# Patient Record
Sex: MALE | Race: BLACK OR AFRICAN AMERICAN
[De-identification: names, ages, dates, MRNs, and addresses within clinical notes are randomized per-mention and may not be internally consistent; named-entity substitution may affect disease eponyms.]

---

## 2020-12-12 ENCOUNTER — HOSPITAL ENCOUNTER (EMERGENCY)
Dept: HOSPITAL 8 - ED | Age: 20
Discharge: HOME | End: 2020-12-12
Payer: SELF-PAY

## 2020-12-12 VITALS — HEIGHT: 72 IN | BODY MASS INDEX: 19.47 KG/M2 | WEIGHT: 143.74 LBS

## 2020-12-12 VITALS — SYSTOLIC BLOOD PRESSURE: 116 MMHG | DIASTOLIC BLOOD PRESSURE: 81 MMHG

## 2020-12-12 DIAGNOSIS — Z20.2: ICD-10-CM

## 2020-12-12 DIAGNOSIS — R30.0: Primary | ICD-10-CM

## 2020-12-12 LAB — MICROSCOPIC: (no result)

## 2020-12-12 PROCEDURE — 87491 CHLMYD TRACH DNA AMP PROBE: CPT

## 2020-12-12 PROCEDURE — 81001 URINALYSIS AUTO W/SCOPE: CPT

## 2020-12-12 PROCEDURE — 87086 URINE CULTURE/COLONY COUNT: CPT

## 2020-12-12 PROCEDURE — 96372 THER/PROPH/DIAG INJ SC/IM: CPT

## 2020-12-12 PROCEDURE — 99283 EMERGENCY DEPT VISIT LOW MDM: CPT

## 2020-12-12 PROCEDURE — 87591 N.GONORRHOEAE DNA AMP PROB: CPT

## 2022-01-20 ENCOUNTER — TELEPHONE (OUTPATIENT)
Dept: SCHEDULING | Facility: IMAGING CENTER | Age: 22
End: 2022-01-20

## 2022-06-15 ENCOUNTER — HOSPITAL ENCOUNTER (EMERGENCY)
Facility: MEDICAL CENTER | Age: 22
End: 2022-06-15
Attending: EMERGENCY MEDICINE

## 2022-06-15 VITALS
TEMPERATURE: 97.9 F | SYSTOLIC BLOOD PRESSURE: 123 MMHG | OXYGEN SATURATION: 99 % | HEART RATE: 60 BPM | RESPIRATION RATE: 14 BRPM | DIASTOLIC BLOOD PRESSURE: 80 MMHG | WEIGHT: 159.83 LBS

## 2022-06-15 DIAGNOSIS — N34.2 URETHRITIS: ICD-10-CM

## 2022-06-15 LAB
C TRACH DNA SPEC QL NAA+PROBE: POSITIVE
HIV 1+2 AB+HIV1 P24 AG SERPL QL IA: NORMAL
N GONORRHOEA DNA SPEC QL NAA+PROBE: NEGATIVE
SPECIMEN SOURCE: ABNORMAL
T PALLIDUM AB SER QL IA: NORMAL

## 2022-06-15 PROCEDURE — 86780 TREPONEMA PALLIDUM: CPT

## 2022-06-15 PROCEDURE — 700111 HCHG RX REV CODE 636 W/ 250 OVERRIDE (IP): Performed by: EMERGENCY MEDICINE

## 2022-06-15 PROCEDURE — 96372 THER/PROPH/DIAG INJ SC/IM: CPT

## 2022-06-15 PROCEDURE — 87491 CHLMYD TRACH DNA AMP PROBE: CPT

## 2022-06-15 PROCEDURE — 700102 HCHG RX REV CODE 250 W/ 637 OVERRIDE(OP): Performed by: EMERGENCY MEDICINE

## 2022-06-15 PROCEDURE — 700101 HCHG RX REV CODE 250

## 2022-06-15 PROCEDURE — 87591 N.GONORRHOEAE DNA AMP PROB: CPT

## 2022-06-15 PROCEDURE — 87389 HIV-1 AG W/HIV-1&-2 AB AG IA: CPT

## 2022-06-15 PROCEDURE — A9270 NON-COVERED ITEM OR SERVICE: HCPCS | Performed by: EMERGENCY MEDICINE

## 2022-06-15 PROCEDURE — 99284 EMERGENCY DEPT VISIT MOD MDM: CPT

## 2022-06-15 RX ORDER — DOXYCYCLINE 100 MG/1
100 TABLET ORAL ONCE
Status: COMPLETED | OUTPATIENT
Start: 2022-06-15 | End: 2022-06-15

## 2022-06-15 RX ORDER — CEFTRIAXONE 500 MG/1
500 INJECTION, POWDER, FOR SOLUTION INTRAMUSCULAR; INTRAVENOUS ONCE
Status: COMPLETED | OUTPATIENT
Start: 2022-06-15 | End: 2022-06-15

## 2022-06-15 RX ADMIN — DOXYCYCLINE 100 MG: 100 TABLET, FILM COATED ORAL at 17:33

## 2022-06-15 RX ADMIN — CEFTRIAXONE SODIUM 500 MG: 500 INJECTION, POWDER, FOR SOLUTION INTRAMUSCULAR; INTRAVENOUS at 17:33

## 2022-06-15 RX ADMIN — LIDOCAINE HYDROCHLORIDE 1 ML: 10 INJECTION, SOLUTION EPIDURAL; INFILTRATION; INTRACAUDAL; PERINEURAL at 17:33

## 2022-06-15 ASSESSMENT — LIFESTYLE VARIABLES: DO YOU DRINK ALCOHOL: NO

## 2022-06-15 NOTE — ED TRIAGE NOTES
20 y/o male ambulate to triage   Chief Complaint   Patient presents with   • Penis Pain     With discharge and itch x 2 days

## 2022-06-16 RX ORDER — DOXYCYCLINE 100 MG/1
100 CAPSULE ORAL 2 TIMES DAILY
Qty: 14 CAPSULE | Refills: 0 | Status: SHIPPED | OUTPATIENT
Start: 2022-06-16 | End: 2022-06-23

## 2022-06-16 NOTE — ED NOTES
ED Positive Culture Follow-up/Notification Note:    Date: 2022     Patient seen in the ED on 6/15/2022 for penile pain. Patient received ceftriaxone 500 mg IM x1 and doxycycline 100 mg PO x1 in the ED.   1. Urethritis       There are no discharge medications for this patient.      Allergies: Patient has no allergy information on record.     Vitals:    06/15/22 1535 06/15/22 1539 06/15/22 1830   BP: 121/70  123/80   Pulse: 93  60   Resp: 14  14   Temp: 36.3 °C (97.3 °F)  36.6 °C (97.9 °F)   TempSrc: Temporal     SpO2: 97%  99%   Weight:  72.5 kg (159 lb 13.3 oz)        Final cultures:   Results     Procedure Component Value Units Date/Time    Chlamydia/GC, PCR (Urine) [[id number]]  (Abnormal) Collected: 06/15/22 1551    Order Status: Completed Specimen: Urine Updated: 06/15/22 2035     C. trachomatis by PCR POSITIVE     N. gonorrhoeae by PCR Negative     Source Urine          Plan:   Patient tested positive for chlamydia in the ER. However, patient does not have a prescription for doxycycline on file. Spoke with patient over the telephone. Informed patient he will need treatment with doxycyline. Counseled the patient to abstain from sexual intercourse 7 days after antibiotic therapy is completed, to notify any sexual partners within the last 60 days to go the [facility] for testing, to seek further STD/HIV testing, and to seek medical attention if symptoms persist. Patient voiced understanding.     Rx for doxycycline 100 mg PO BID x7 days #14 electronically sent to [facility] at [address].    Expedited Partner Therapy with azithromycin 1 g PO x1 no refills called in to same pharmacy for patient's partner:    Name: Jerrica Baum  : 2002  Allergies: None    Madison Mallory, PharmD  PGY2 Infectious Diseases Pharmacy Resident

## 2022-06-16 NOTE — ED NOTES
Discharge instructions given to patient, a verbal understanding of all instructions was stated. Pt preferred to walk out accompanied by self VSS, all belongings accounted for. Pt instructed to [facility] for [facility] for test results.

## 2022-06-16 NOTE — ED PROVIDER NOTES
"CHIEF COMPLAINT  Chief Complaint   Patient presents with   • Penis Pain     With discharge and itch x 2 days        HPI  Dain Vilchis is a 21 y.o. male who presents to the Emergency Department with a chief complaint of penile discharge and pain he has had chlamydia before, he is here with his girlfriend who is 17 weeks pregnant.  When asked alone if he has had intercourse with more than 1 person he stated \"not that I know of\" I asked him as well if he would want to be treated just in case and he answered yes    REVIEW OF SYSTEMS  As above, all other systems negative.  PAST MEDICAL HISTORY   Chlamydia    SOCIAL HISTORY  Social History     Tobacco Use   • Smoking status: Never Smoker   • Smokeless tobacco: Never Used   Vaping Use   • Vaping Use: Never used   Substance and Sexual Activity   • Alcohol use: Yes     Alcohol/week: 0.6 oz     Types: 1 Cans of beer per week   • Drug use: Never   • Sexual activity: Not on file       SURGICAL HISTORY  patient denies any surgical history    CURRENT MEDICATIONS  Reviewed.  See Encounter Summary.  Include none    ALLERGIES  Not on File    PHYSICAL EXAM  VITAL SIGNS: /80   Pulse 60   Temp 36.6 °C (97.9 °F)   Resp 14   Wt 72.5 kg (159 lb 13.3 oz)   SpO2 99%   Constitutional:, Alert in no apparent distress.  HENT: Normocephalic, Bilateral external ears normal. Nose normal.   Eyes: Pupils are equal and reactive. Conjunctiva normal, non-icteric.   Thorax & Lungs: Easy unlabored respirations  Abdomen:  No gross signs of peritonitis, no pain with movement   Skin: Visualized skin is  Dry, No erythema, No rash.   Extremities:   No edema, No asymmetry  Neurologic: Alert, Grossly non-focal.   Psychiatric: Affect and Mood normal      COURSE & MEDICAL DECISION MAKING  Nursing notes and vital signs were reviewed. (See chart for details)    The patient presents to the Emergency Department with penile discharge, concern for STI he will be tested for HIV syphilis and tested for " gonorrhea and chlamydia as well as treated with IM Rocephin and azithromycin orally    No orders to display     Results for orders placed or performed during the hospital encounter of 06/15/22   Chlamydia/GC, PCR (Urine)    Specimen: Urine   Result Value Ref Range    Source Urine    T. Pallidum AB EIA (Syphilis)   Result Value Ref Range    Syphilis, Treponemal Qual Non-Reactive Non-Reactive   HIV AG/AB Combo Assay Screening   Result Value Ref Range    HIV Ag/Ab Combo Assay Non-Reactive Non Reactive     HIV and syphilis are nonreactive he will watch my chart for chlamydia and gonorrhea results although he has been treated    The patient was discharged home with an information sheet on sexually transmitted infections and told to return immediately for any signs or symptoms listed, or any unexpected symptoms.  The patient verbally agreed to the discharge precautions and follow-up plan which is documented in EPIC.  DISPOSITION:    Patient will be discharged home in stable condition.    FOLLOW UP:  [address] [address] [phone]          OUTPATIENT MEDICATIONS:  There are no discharge medications for this patient.        FINAL IMPRESSION   1. Urethritis